# Patient Record
Sex: FEMALE | URBAN - METROPOLITAN AREA
[De-identification: names, ages, dates, MRNs, and addresses within clinical notes are randomized per-mention and may not be internally consistent; named-entity substitution may affect disease eponyms.]

---

## 2018-06-01 ENCOUNTER — APPOINTMENT (RX ONLY)
Dept: URBAN - METROPOLITAN AREA CLINIC 23 | Facility: CLINIC | Age: 38
Setting detail: DERMATOLOGY
End: 2018-06-01

## 2018-06-01 DIAGNOSIS — Z41.9 ENCOUNTER FOR PROCEDURE FOR PURPOSES OTHER THAN REMEDYING HEALTH STATE, UNSPECIFIED: ICD-10-CM

## 2018-06-01 PROCEDURE — ? DYSPORT

## 2018-06-01 ASSESSMENT — LOCATION ZONE DERM: LOCATION ZONE: FACE

## 2018-06-01 ASSESSMENT — LOCATION SIMPLE DESCRIPTION DERM
LOCATION SIMPLE: RIGHT FOREHEAD
LOCATION SIMPLE: LEFT FOREHEAD
LOCATION SIMPLE: GLABELLA

## 2018-06-01 ASSESSMENT — LOCATION DETAILED DESCRIPTION DERM
LOCATION DETAILED: RIGHT FOREHEAD
LOCATION DETAILED: LEFT FOREHEAD
LOCATION DETAILED: GLABELLA

## 2018-06-01 NOTE — PROCEDURE: MIPS QUALITY
Quality 110: Preventive Care And Screening: Influenza Immunization: Influenza Immunization not Administered due to Patient Allergy.
Detail Level: Simple

## 2018-06-01 NOTE — PROCEDURE: DYSPORT
Additional Area 6 Units: 0
Glabellar Complex Units: [address]
Lot #: [id number]
Additional Area 4 Location: vermillion lips
Expiration Date (Month Year): 11/30/18
Forehead Units: 25
Additional Area 3 Location: lateral eyes
Periorbital Skin Units: [address]
Additional Area 5 Location: lateral brows
Consent: Written consent obtained. Risks include but not limited to lid/brow ptosis, bruising, swelling, diplopia, temporary effect, incomplete chemical denervation.
Post-Care Instructions: Patient instructed to not lie down for 4 hours and limit physical activity for 24 hours. Patient instructed not to travel by airplane for 48 hours.
Dilution (U/ 0.1cc): 1.5
Detail Level: Simple
Additional Area 2 Location: high forehead 2cm above eyebrows
Additional Area 2 Units: 10
Additional Area 1 Location: chin

## 2018-07-03 ENCOUNTER — APPOINTMENT (RX ONLY)
Dept: URBAN - METROPOLITAN AREA CLINIC 23 | Facility: CLINIC | Age: 38
Setting detail: DERMATOLOGY
End: 2018-07-03

## 2018-07-03 DIAGNOSIS — Z41.9 ENCOUNTER FOR PROCEDURE FOR PURPOSES OTHER THAN REMEDYING HEALTH STATE, UNSPECIFIED: ICD-10-CM

## 2018-07-03 PROCEDURE — ? FILLERS

## 2018-07-03 PROCEDURE — ? HYDRAFACIAL

## 2018-07-03 ASSESSMENT — LOCATION SIMPLE DESCRIPTION DERM: LOCATION SIMPLE: LEFT CHEEK

## 2018-07-03 ASSESSMENT — LOCATION DETAILED DESCRIPTION DERM: LOCATION DETAILED: LEFT CENTRAL MALAR CHEEK

## 2018-07-03 ASSESSMENT — LOCATION ZONE DERM: LOCATION ZONE: FACE

## 2018-07-03 NOTE — PROCEDURE: HYDRAFACIAL
Vacuum Pressure: [facility]
Number Of Passes: 2
Consent: Written consent obtained, risks reviewed including but not limited to crusting, scabbing, blistering, scarring, darker or lighter pigmentary change, bruising, and/or incomplete response.
Additional Vacuum Pressure (Won't Render If 0): 0
Indication: skin texture
Detail Level: Zone
Post-Care Instructions: I reviewed with the patient in detail post-care instructions. Patient should stay away from the sun and wear sun protection until treated areas are fully healed.
Glycolic Acid %: 7.5%
Treatment Number: 1

## 2018-07-03 NOTE — PROCEDURE: FILLERS
Additional Area 1 Location: cheeks and jawline
Vermilion Lips Filler Volume In Cc: 0
Lot #: [id number]
Expiration Date (Month Year): 08/2020
Map Statment: See [address] for Complete Details
Anesthesia Volume In Cc: 0.2
Additional Area 4 Location: lateral cheeks, lips, NLFs
Post-Care Instructions: Patient instructed to apply ice to reduce swelling.
Expiration Date (Month Year): 08/01/2019
Price (Use Numbers Only, No Special Characters Or $): 0.00
Include Cannula Length?: 1.5 inch
Additional Area 1 Location: using the cannula to medial cheeks, with conventional needle to tear through.
Include Cannula Information In Note?: No
Additional Area 3 Location: right cheek with cannula, left marionette lines
Detail Level: Simple
Additional Area 5 Location: marionette lines, lateral mouth, NLFs, vermillion border
Filler: Voluma
Cheeks Filler Volume In Cc: 0.8
Lot #: [id number]
Consent: Written consent obtained. Risks include but not limited to bruising, beading, irregular texture, ulceration, infection, allergic reaction, scar formation, incomplete augmentation, temporary nature, procedural pain.
Anesthesia Type: 1% lidocaine with epinephrine
Map Statment: See Attach Map for Complete Details
Lot #: [id number]
Expiration Date (Month Year): 08/26/2019
Filler: Juvederm Ultra
Lot #: [id number]